# Patient Record
Sex: MALE | Race: BLACK OR AFRICAN AMERICAN | NOT HISPANIC OR LATINO | URBAN - METROPOLITAN AREA
[De-identification: names, ages, dates, MRNs, and addresses within clinical notes are randomized per-mention and may not be internally consistent; named-entity substitution may affect disease eponyms.]

---

## 2020-09-06 ENCOUNTER — EMERGENCY (EMERGENCY)
Facility: HOSPITAL | Age: 30
LOS: 1 days | Discharge: HOME | End: 2020-09-06
Attending: EMERGENCY MEDICINE | Admitting: EMERGENCY MEDICINE
Payer: COMMERCIAL

## 2020-09-06 VITALS
DIASTOLIC BLOOD PRESSURE: 77 MMHG | SYSTOLIC BLOOD PRESSURE: 114 MMHG | TEMPERATURE: 99 F | OXYGEN SATURATION: 97 % | RESPIRATION RATE: 14 BRPM | HEART RATE: 111 BPM | WEIGHT: 184.97 LBS | HEIGHT: 69 IN

## 2020-09-06 VITALS
RESPIRATION RATE: 16 BRPM | TEMPERATURE: 99 F | HEART RATE: 86 BPM | SYSTOLIC BLOOD PRESSURE: 110 MMHG | OXYGEN SATURATION: 99 % | DIASTOLIC BLOOD PRESSURE: 69 MMHG

## 2020-09-06 DIAGNOSIS — S86.019A STRAIN OF UNSPECIFIED ACHILLES TENDON, INITIAL ENCOUNTER: Chronic | ICD-10-CM

## 2020-09-06 PROCEDURE — 73590 X-RAY EXAM OF LOWER LEG: CPT

## 2020-09-06 PROCEDURE — 99284 EMERGENCY DEPT VISIT MOD MDM: CPT

## 2020-09-06 PROCEDURE — 73590 X-RAY EXAM OF LOWER LEG: CPT | Mod: 26,RT

## 2020-09-06 PROCEDURE — 29515 APPLICATION SHORT LEG SPLINT: CPT | Mod: RT

## 2020-09-06 PROCEDURE — 73610 X-RAY EXAM OF ANKLE: CPT

## 2020-09-06 PROCEDURE — 99284 EMERGENCY DEPT VISIT MOD MDM: CPT | Mod: 25

## 2020-09-06 PROCEDURE — 73600 X-RAY EXAM OF ANKLE: CPT

## 2020-09-06 PROCEDURE — 73610 X-RAY EXAM OF ANKLE: CPT | Mod: 26,76,RT

## 2020-09-06 NOTE — ED ADULT NURSE NOTE - OBJECTIVE STATEMENT
Received pt in bed alert and oriented x4.  C/O right achilles pain.  Pt stated he was playing basketball and heard a "pop".  Hx of achilles rupture on left 4 yrs ago. Pt unable to put weight on right leg.  Ongoing nursing care and safety maintained.

## 2020-09-06 NOTE — ED PROVIDER NOTE - ATTENDING CONTRIBUTION TO CARE
Gordon ANDERSON MD have performed the initial face to face bedside interview with this patient regarding history of present illness, review of symptoms and relevant past medical, social and family history.  I completed an independent physical examination.  I was the initial provider who evaluated this patient. I have have reviewed and discussed evaluation and management of patient with the ACP.  I have communicated any changes to the patient’s plan of care and disposition with the ACP.   30 yo male while playing basketball developed right heel area pain and felt/heard pop. Exam revealed achilles apparatus on right absent/ruptured.

## 2020-09-06 NOTE — ED PROVIDER NOTE - MUSCULOSKELETAL MINIMAL EXAM
+ TTP to right posterior ankle with deficit at site of achilles tendon. + cadena sign concerning of achilles tendon injury. no bony tenderness to right ankle. no TTP right foot or knee. dp pulses equal and intact bilaterally.

## 2020-09-06 NOTE — ED PROVIDER NOTE - CARE PROVIDER_API CALL
Jeyson Haq  ORTHOPAEDIC SURGERY  [address] [place] [address]  Phone: [phone]  Fax: [phone]  Follow Up Time: 1-3 Days

## 2020-09-06 NOTE — ED PROVIDER NOTE - NSFOLLOWUPINSTRUCTIONS_ED_ALL_ED_FT
Achilles Tendon Rupture    The Achilles tendon is a cord-like band that connects the muscles of your lower leg (calf) to your heel. An Achilles tendon rupture is an injury that involves a tear in this tendon. This tendon is the most common site of tendon tearing.    What are the causes?  This condition may be caused by:  Stress from a sudden stretching of the tendon. For example, this may occur when you land from a jump or when your heel drops down into a hole on uneven ground.  A hard, direct hit to the tendon.  Pushing off your foot forcefully, such as when sprinting, jumping, or changing direction while running.  What increases the risk?  This condition is more likely to develop in:  Runners.  People who play sports that involve sprinting, running, or jumping.  People who play contact sports.  People with a weak Achilles tendon. Tendons can weaken from aging, repeat injuries, and chronic tendinitis.  Males who are 30–50 years of age, especially those who do not exercise regularly.  What are the signs or symptoms?  Symptoms of this condition include:  Hearing a "pop" at the time of injury.  Severe, sudden pain in the back of the ankle.  Swelling and bruising.  Inability to actively point your toes down.  Pain when standing or walking.  A feeling of giving way when you step on the affected side.  How is this diagnosed?  This condition is usually diagnosed with a physical exam. During the exam, your health care provider may:  Touch the tendon and the structures around it.  Squeeze your calf to see if your foot moves.  Ask you to point and flex your foot.  Sometimes, tests are done in addition to an exam. Tests may include:  An ultrasound.  An X-ray.  MRI.  How is this treated?  This condition may be treated with:  Ice applied to the area.  Pain medicine.  Rest.  Crutches.  A cast, splint, or other device to keep the ankle from moving (keep it immobilized).  Heel wedges to reduce the stretch on your tendon as it heals.  Surgery. This option may depend on your age and your activity level.  Follow these instructions at home:  If you have a splint or brace:     Do not put pressure on any part of the splint until it is fully hardened. This may take several hours.  Wear the splint or brace as told by your health care provider. Remove it only as told by your health care provider.  Loosen the splint or brace if your toes tingle, become numb, or turn cold and blue.  Do not let your splint or brace get wet if it is not waterproof.  Keep the splint or brace clean.  If you have a cast:     Do not put pressure on any part of the cast until it is fully hardened. This may take several hours.  Do not stick anything inside the cast to scratch your skin. Doing that increases your risk of infection.  Check the skin around the cast every day. Tell your health care provider about any concerns.  You may put lotion on dry skin around the edges of the cast. Do not put lotion on the skin underneath the cast.  Do not let your cast get wet if it is not waterproof.  Keep the cast clean.  Bathing     Do not take baths, swim, or use a hot tub until your health care provider approves. Ask your health care provider if you can take showers. You may only be allowed to take sponge baths for bathing.  If your cast, splint, or brace is not waterproof, cover it with a watertight covering when you take a bath or a shower.  Managing pain, stiffness, and swelling     If directed, apply ice to the injured area.  Put ice in a plastic bag.  Place a towel between your skin and the bag.  Leave the ice on for 20 minutes, 2–3 times a day.  Move your toes often to avoid stiffness and to lessen swelling.  Raise (elevate) the injured area above the level of your heart while you are sitting or lying down. Do not dangle your leg over a chair, couch, or bed.  Driving     Do not drive or operate heavy machinery while taking prescription pain medicine.  Ask your health care provider when it is safe to drive if you have a cast, splint, or brace on a leg or foot that you use for driving.  Activity     Return to your normal activities as told by your health care provider. Ask your health care provider what activities are safe for you.  Do exercises only as told by your health care provider.  General instructions     Do not use the injured limb to support your body weight until your health care provider says that you can. Use crutches as told by your health care provider.  Do not use any tobacco products, such as cigarettes, chewing tobacco, and e-cigarettes. Tobacco can delay bone healing. If you need help quitting, ask your health care provider.  Take over-the-counter and prescription medicines only as told by your health care provider.  Keep all follow-up visits as told by your health care provider. This is important.  How is this prevented?  Warm up and stretch before being active.  Cool down and stretch after being active.  Give your body time to rest between periods of activity.  Make sure to use equipment that fits you.  Be safe and responsible while being active to avoid falls.  Each week, do at least 150 minutes of moderate-intensity exercise, such as brisk walking or water aerobics.  Spread your workouts over the whole week, instead of just working out intensely one or two days of the week.  Make slow, incremental changes in intensity, distance, or time for running or sporting activity.  Maintain physical fitness, including:  Strength.  Flexibility.  Cardiovascular fitness.  Endurance.  Contact a health care provider if:  Your pain and swelling increase.  Your pain is not controlled with medicines.  You develop new, unexplained symptoms.  Your symptoms get worse.  You cannot move your toes or foot.  You develop warmth and swelling in your foot.  You have an unexplained fever.  This information is not intended to replace advice given to you by your health care provider. Make sure you discuss any questions you have with your health care provider.    Document Released: 12/18/2006 Document Revised: 06/04/2018 Document Reviewed: 11/09/2016  [employer] Interactive Patient Education © 2019 [employer]

## 2020-09-06 NOTE — ED PROVIDER NOTE - OBJECTIVE STATEMENT
30 yo male with no significant pmh presents to the ED c/o right posterior ankle pain occurred while playing basketball. Patient explains he jumped up and when he landed felt a pop in his posterior ankle/achilles tendon. No head trauma or LOC. Patient with difficulty ambulating since injury. Patient admits to having left achilles tendon injury and repair >4 years ago in [place] (doesn't remember orthopedists.) Denies fever, chills, chest pain, sob, abd pain, N/V, UE/LE weakness or paresthesias. no hip or knee pain.

## 2020-09-06 NOTE — ED ADULT NURSE REASSESSMENT NOTE - NS ED NURSE REASSESS COMMENT FT1
Pt is Aox4 has ankle wrapped and receiving x-rays. Pt denies pain at this time states it comes in waves. Wife at bedside, VSS will reassess. Pt is Aox4 has R ankle wrapped and receiving x-rays. Pt denies pain at this time states it comes in waves. Wife at bedside, VSS will reassess.

## 2020-09-06 NOTE — ED ADULT TRIAGE NOTE - CHIEF COMPLAINT QUOTE
pt pw R ankle and calf pain felt "pop" then was unable to walk. Hx of similar on L leg w/ achilles tendon rupture.

## 2020-09-06 NOTE — ED PROVIDER NOTE - CLINICAL SUMMARY MEDICAL DECISION MAKING FREE TEXT BOX
30 yo male with no significant pmh presents to the ED c/o right posterior ankle pain occurred while playing basketball. Patient explains he jumped up and when he landed felt a pop in his posterior ankle/achilles tendon. No head trauma or LOC. Patient with difficulty ambulating since injury. Patient admits to having left achilles tendon injury and repair >4 years ago in [place] (doesn't remember orthopedists.) Denies fever, chills, chest pain, sob, abd pain, N/V, UE/LE weakness or paresthesias. no hip or knee pain. A/P: exam concerning for achilles tendon injury. Ortho evaluated patient, splint placed, xray neg for fx. Will follow up with ortho

## 2020-09-06 NOTE — CONSULT NOTE ADULT - SUBJECTIVE AND OBJECTIVE BOX
29y Male community ambulator presents c/o right Posterior Ankle/Calf pain s/p jumping while playing basketball. Felt a "pop" during the injury. Denies numbness/tingling. Denies fever/chills. Denies pain/injury elsewhere.     HEALTH ISSUES - PROBLEM Dx:    PAST MEDICAL & SURGICAL HISTORY:  No pertinent past medical history  Achilles rupture    MEDICATIONS  (STANDING):    Allergies    No Known Allergies    Intolerances    Vital Signs Last 24 Hrs  T(C): 37.3 (09-06-20 @ 16:32), Max: 37.3 (09-06-20 @ 16:32)  T(F): 99.2 (09-06-20 @ 16:32), Max: 99.2 (09-06-20 @ 16:32)  HR: 111 (09-06-20 @ 16:32) (111 - 111)  BP: 114/77 (09-06-20 @ 16:32) (114/77 - 114/77)  BP(mean): --  RR: 14 (09-06-20 @ 16:32) (14 - 14)  SpO2: 97% (09-06-20 @ 16:32) (97% - 97%)      RLE:   Skin intact  Palpable achilles tendon defect  Positive [place].   Poor strength on plantar flexion against resistance.   DP/PT pulses palpable  Compartments soft    Imaging:   XR L Tibia/Fibula: demonstrates no obvious fracture    Procedure:   After verbal consent obtained by patient, a well padded posterior slab with patient in equinus was applied. NVI post procedure    A/P: 29y Male with possible Achilles tendon rupture  NON weight bearing in splint (can remove to shower).  Crutches  ICE  Analgesia  FU with Orthopedist as outptDr. Haq  Orthopedically stable for DC  Discussed with Dr. Haq

## 2020-09-06 NOTE — ED PROVIDER NOTE - PATIENT PORTAL LINK FT
You can access the FollowMyHealth Patient Portal offered by [facility] by registering at the following website: [email or web address]. By joining [email or web address]’s FollowMyHealth portal, you will also be able to view your health information using other applications (apps) compatible with our system.

## 2020-09-08 PROBLEM — Z78.9 OTHER SPECIFIED HEALTH STATUS: Chronic | Status: ACTIVE | Noted: 2020-09-06

## 2020-09-08 PROBLEM — Z00.00 ENCOUNTER FOR PREVENTIVE HEALTH EXAMINATION: Status: ACTIVE | Noted: 2020-09-08

## 2020-09-11 ENCOUNTER — APPOINTMENT (OUTPATIENT)
Dept: ORTHOPEDIC SURGERY | Facility: CLINIC | Age: 30
End: 2020-09-11